# Patient Record
Sex: FEMALE | NOT HISPANIC OR LATINO | Employment: FULL TIME | URBAN - METROPOLITAN AREA
[De-identification: names, ages, dates, MRNs, and addresses within clinical notes are randomized per-mention and may not be internally consistent; named-entity substitution may affect disease eponyms.]

---

## 2023-12-04 NOTE — PROGRESS NOTES
HEARING AID CHECK    Name: Carli Moore  YOB: 1965  Age: 58 y.o.    Date:  12/05/2023    History:  Carli Moore , age 58 years, is here for a hearing aid check. She reports that the right hearing aid is no longer working. She has tried to change the wax traps and batteries at home but this has not solved the problem.       Recall, her most recent hearing test on 8/20/2021 indicated mild sloping to moderately severe sensorineural hearing loss of both ears. She has history of hearing aid use and previously wore Oticon- in the ear hearing aids purchased in 2018 through [facility].     Hearing Aid Information  Right: Phonak Virto  SN: [id number] ~ AOV   Left: Phonak Virto  SN: [id number] ~ AOV   Warranty expiration date: 1/28/2025  Fit date: 2/18/2021     Hearing Aid Check Procedure  Listening check confirms the right device to be non-functional. Battery was changed and wax was removed from the device. This did not fix the problem. Neither hearing aid had wax traps in them. The left device lost it's grommet.    It was recommended that both devices be sent in for repair due to the right device being non-functional and the left device no longer having a grommet to hold the wax trap in place. The patient stated that she did not wish to have her left hearing aid sent in due to the upcoming holiday season. We will send in the just the right hearing aid for now.    The patient was given several packs of cerustops, per her request.     Recommendations  1) Continue use of left hearing aid.  2) Return for hearing aid pickup once it returns from repair. The patient would like to be contacted once it arrives.  3) Re-test hearing annually     Time: 2348-1539    JANET Heller, CCC-A  Licensed Audiologist

## 2023-12-05 ENCOUNTER — CLINICAL SUPPORT (OUTPATIENT)
Dept: AUDIOLOGY | Facility: CLINIC | Age: 58
End: 2023-12-05

## 2023-12-05 DIAGNOSIS — H90.3 SENSORINEURAL HEARING LOSS (SNHL) OF BOTH EARS: Primary | ICD-10-CM

## 2023-12-05 PROCEDURE — V5299 HEARING SERVICE: HCPCS

## 2023-12-05 ASSESSMENT — PAIN SCALES - GENERAL: PAINLEVEL_OUTOF10: 0 - NO PAIN

## 2023-12-15 ENCOUNTER — CLINICAL SUPPORT (OUTPATIENT)
Dept: AUDIOLOGY | Facility: CLINIC | Age: 58
End: 2023-12-15
Payer: COMMERCIAL

## 2023-12-15 DIAGNOSIS — H90.3 SENSORINEURAL HEARING LOSS (SNHL) OF BOTH EARS: Primary | ICD-10-CM

## 2023-12-15 PROCEDURE — V5299 HEARING SERVICE: HCPCS

## 2023-12-15 NOTE — PROGRESS NOTES
HEARING AID CHECK    Name: Carli Moore  YOB: 1965  Age: 58 y.o.    Date:  12/15/2023    History:  Carli Moore , age 58 years, is here for a hearing aid check. She is here to  the right hearing aid from repair. The internal electronics and receivers were replaced, per Phonak. She noted concerns that the left device is not loud enough.    Recall, her most recent hearing test on 8/20/2021 indicated mild sloping to moderately severe sensorineural hearing loss of both ears. She has history of hearing aid use and previously wore Oticon- in the ear hearing aids purchased in 2018 through [facility].     Hearing Aid Information  Right: Phonak Virto  SN: [id number] ~ AOV   Left: Phonak Virto  SN: [id number] ~ AOV   Warranty expiration date: 1/28/2025  Fit date: 2/18/2021     Hearing Aid Check Procedure  The right hearing aid was programmed to match previous settings. It was able to connect to the patient's phone. She reported satisfaction with the sound quality and loudness of this device.    It was determined that the patient was using the volume control on the left device, which was turning it down when she thought she was turning it up. She stated that she would like the left hearing aid sent in for repair, as it is missing it's grommet to hold the wax trap.       Recommendations  1) Return for hearing aid pickup once the left device has returned from repair. The patient will be contacted once it arrives.  2) Schedule hearing aid checks as needed.  3) Re-test hearing annually.    Time: 2251-2376    JANET Heller, CCC-A  Licensed Audiologist

## 2024-01-04 ENCOUNTER — CLINICAL SUPPORT (OUTPATIENT)
Dept: AUDIOLOGY | Facility: CLINIC | Age: 59
End: 2024-01-04
Payer: COMMERCIAL

## 2024-01-04 DIAGNOSIS — H90.3 SENSORINEURAL HEARING LOSS (SNHL) OF BOTH EARS: Primary | ICD-10-CM

## 2024-01-04 ASSESSMENT — PAIN SCALES - GENERAL: PAINLEVEL_OUTOF10: 0 - NO PAIN

## 2024-01-04 NOTE — PROGRESS NOTES
HEARING AID CHECK    Name: Carli Moore  YOB: 1965  Age: 58 y.o.    Date:  01/04/2024    History:  Carli Moore , age 58 years, is here for a hearing aid check. She is here to  the left hearing aid from repair. The internal electronics and receivers were replaced, per Phonak.     Recall, her most recent hearing test on 8/20/2021 indicated mild sloping to moderately severe sensorineural hearing loss of both ears. She has history of hearing aid use and previously wore Oticon- in the ear hearing aids purchased in 2018 through [facility].     Hearing Aid Information  Right: Phonak Virto  SN: [id number] ~ AOV   Left: Phonak Virto  SN: [id number] ~ AOV   Warranty expiration date: 1/28/2025  Fit date: 2/18/2021     Hearing Aid Check Procedure  The left hearing aid was programmed to match previous settings. It was able to connect to the patient's phone. She reported satisfaction with the sound quality and loudness of this device.    Recommendations  1) Continue medical follow-up with established providers  2) Schedule hearing aid checks as needed.  3) Re-test hearing annually.    Time: 6921-7082    JANET Heller, CCC-A  Licensed Audiologist

## 2024-01-24 ENCOUNTER — TELEMEDICINE (OUTPATIENT)
Dept: PRIMARY CARE | Facility: CLINIC | Age: 59
End: 2024-01-24
Payer: COMMERCIAL

## 2024-01-24 VITALS — HEIGHT: 66 IN | BODY MASS INDEX: 28.12 KG/M2 | WEIGHT: 175 LBS

## 2024-01-24 DIAGNOSIS — J40 BRONCHITIS: Primary | ICD-10-CM

## 2024-01-24 PROBLEM — H61.23 BILATERAL IMPACTED CERUMEN: Status: ACTIVE | Noted: 2024-01-24

## 2024-01-24 PROBLEM — H90.3 BILATERAL SENSORINEURAL HEARING LOSS: Status: ACTIVE | Noted: 2024-01-24

## 2024-01-24 PROBLEM — N95.1 VAGINAL DRYNESS, MENOPAUSAL: Status: ACTIVE | Noted: 2024-01-24

## 2024-01-24 PROBLEM — R92.8 ABNORMAL MAMMOGRAM: Status: ACTIVE | Noted: 2022-02-21

## 2024-01-24 PROBLEM — G56.03 BILATERAL CARPAL TUNNEL SYNDROME: Status: ACTIVE | Noted: 2017-12-07

## 2024-01-24 PROBLEM — N64.4 BREAST PAIN: Status: ACTIVE | Noted: 2024-01-24

## 2024-01-24 PROBLEM — M65.312 TRIGGER THUMB OF BOTH THUMBS: Status: ACTIVE | Noted: 2019-01-08

## 2024-01-24 PROBLEM — R29.898 WEAKNESS OF HAND: Status: ACTIVE | Noted: 2024-01-24

## 2024-01-24 PROBLEM — N93.9 ABNORMAL UTERINE BLEEDING: Status: ACTIVE | Noted: 2024-01-24

## 2024-01-24 PROBLEM — H91.90 HOH (HARD OF HEARING): Status: ACTIVE | Noted: 2024-01-24

## 2024-01-24 PROBLEM — N95.1 MENOPAUSAL HOT FLUSHES: Status: ACTIVE | Noted: 2024-01-24

## 2024-01-24 PROBLEM — N95.1 HOT FLASHES DUE TO MENOPAUSE: Status: ACTIVE | Noted: 2024-01-24

## 2024-01-24 PROBLEM — M79.602 PAIN OF LEFT UPPER EXTREMITY: Status: ACTIVE | Noted: 2024-01-24

## 2024-01-24 PROBLEM — H52.4 PRESBYOPIA: Status: ACTIVE | Noted: 2017-11-27

## 2024-01-24 PROBLEM — M65.311 TRIGGER THUMB OF BOTH THUMBS: Status: ACTIVE | Noted: 2019-01-08

## 2024-01-24 PROBLEM — N63.10 BREAST MASS, RIGHT: Status: ACTIVE | Noted: 2024-01-24

## 2024-01-24 PROBLEM — N64.89 BREAST ASYMMETRY: Status: ACTIVE | Noted: 2024-01-24

## 2024-01-24 PROBLEM — R29.898 DECREASED GRIP STRENGTH OF LEFT HAND: Status: ACTIVE | Noted: 2024-01-24

## 2024-01-24 PROBLEM — N89.8 VAGINAL ITCHING: Status: ACTIVE | Noted: 2024-01-24

## 2024-01-24 PROBLEM — R92.1 BREAST CALCIFICATION, LEFT: Status: ACTIVE | Noted: 2024-01-24

## 2024-01-24 PROBLEM — Z86.69 H/O CARPAL TUNNEL SYNDROME: Status: ACTIVE | Noted: 2024-01-24

## 2024-01-24 PROBLEM — M77.12 LEFT TENNIS ELBOW: Status: RESOLVED | Noted: 2018-03-06 | Resolved: 2024-01-24

## 2024-01-24 PROBLEM — Z98.890 S/P LASIK (LASER ASSISTED IN SITU KERATOMILEUSIS): Status: ACTIVE | Noted: 2017-11-27

## 2024-01-24 PROBLEM — N89.8 VAGINAL DISCHARGE: Status: ACTIVE | Noted: 2024-01-24

## 2024-01-24 PROCEDURE — 99203 OFFICE O/P NEW LOW 30 MIN: CPT | Performed by: NURSE PRACTITIONER

## 2024-01-24 RX ORDER — AZITHROMYCIN 250 MG/1
TABLET, FILM COATED ORAL
Qty: 6 TABLET | Refills: 0 | Status: SHIPPED | OUTPATIENT
Start: 2024-01-24 | End: 2024-02-01 | Stop reason: ALTCHOICE

## 2024-01-24 ASSESSMENT — ENCOUNTER SYMPTOMS
COUGH: 1
WHEEZING: 0
CHILLS: 0
FEVER: 0
SWEATS: 0
SHORTNESS OF BREATH: 0
HEADACHES: 1
SORE THROAT: 1

## 2024-01-24 NOTE — PROGRESS NOTES
"Vivienne Moore is a 58 y.o. female who presents for Cough (Patient has had a cough for 3 weeks now, not getting any better. Patient has not had any tests done, took an at home COVID test, it was negative. ).  Cough  This is a new problem. Episode onset: 3 weeks ago. The problem has been gradually worsening. The problem occurs constantly. The cough is Productive of sputum. Associated symptoms include ear pain, headaches and a sore throat. Pertinent negatives include no chest pain, chills, fever, nasal congestion, postnasal drip, shortness of breath, sweats or wheezing. Nothing aggravates the symptoms. She has tried OTC cough suppressant for the symptoms. The treatment provided mild relief. Her past medical history is significant for bronchitis.     Review of Systems   Constitutional:  Negative for chills and fever.   HENT:  Positive for ear pain and sore throat. Negative for postnasal drip.    Respiratory:  Positive for cough. Negative for shortness of breath and wheezing.    Cardiovascular:  Negative for chest pain.   Neurological:  Positive for headaches.     Objective   Physical Exam  Constitutional:       General: She is not in acute distress.     Appearance: She is ill-appearing. She is not toxic-appearing.   Eyes:      Conjunctiva/sclera: Conjunctivae normal.   Pulmonary:      Effort: Pulmonary effort is normal.   Neurological:      Mental Status: She is alert and oriented to person, place, and time.     Ht 1.676 m (5' 6\")   Wt 79.4 kg (175 lb)   BMI 28.25 kg/m²   Assessment/Plan   1. Bronchitis  azithromycin (Zithromax) 250 mg tablet      Increase oral fluids/water, at least eight 8-oz glasses/day.  Get plenty of rest.  Cepacol lozenges and/or Chloraseptic spray PRN for sore throat. Salt water gargle or broth for comfort.  Alternate Tylenol/Ibuprofen PRN for body aches and/or fever  Saline nasal spray PRN for rhinitis.  Guaifenessin/Guaifenissin DM PRN for cough  Flonase nasal " spray.    Follow-up as needed.

## 2024-02-01 ENCOUNTER — OFFICE VISIT (OUTPATIENT)
Dept: PRIMARY CARE | Facility: CLINIC | Age: 59
End: 2024-02-01
Payer: COMMERCIAL

## 2024-02-01 VITALS
HEART RATE: 90 BPM | DIASTOLIC BLOOD PRESSURE: 66 MMHG | TEMPERATURE: 97.5 F | WEIGHT: 185 LBS | HEIGHT: 66 IN | RESPIRATION RATE: 18 BRPM | OXYGEN SATURATION: 96 % | SYSTOLIC BLOOD PRESSURE: 107 MMHG | BODY MASS INDEX: 29.73 KG/M2

## 2024-02-01 DIAGNOSIS — J40 BRONCHITIS: Primary | ICD-10-CM

## 2024-02-01 PROCEDURE — 1036F TOBACCO NON-USER: CPT | Performed by: NURSE PRACTITIONER

## 2024-02-01 PROCEDURE — 99213 OFFICE O/P EST LOW 20 MIN: CPT | Performed by: NURSE PRACTITIONER

## 2024-02-01 RX ORDER — ALBUTEROL SULFATE 90 UG/1
2 AEROSOL, METERED RESPIRATORY (INHALATION) EVERY 4 HOURS PRN
Qty: 8.5 G | Refills: 0 | Status: SHIPPED | OUTPATIENT
Start: 2024-02-01 | End: 2025-01-31

## 2024-02-01 RX ORDER — CETIRIZINE HYDROCHLORIDE 10 MG/1
10 TABLET ORAL DAILY
Qty: 30 TABLET | Refills: 5 | Status: SHIPPED | OUTPATIENT
Start: 2024-02-01 | End: 2024-07-30

## 2024-02-01 RX ORDER — METHYLPREDNISOLONE 4 MG/1
TABLET ORAL
Qty: 21 TABLET | Refills: 0 | Status: SHIPPED | OUTPATIENT
Start: 2024-02-01 | End: 2024-02-08

## 2024-02-01 RX ORDER — DOXYCYCLINE 100 MG/1
100 CAPSULE ORAL 2 TIMES DAILY
Qty: 14 CAPSULE | Refills: 0 | Status: SHIPPED | OUTPATIENT
Start: 2024-02-01 | End: 2024-02-08

## 2024-02-01 ASSESSMENT — ENCOUNTER SYMPTOMS
HEADACHES: 1
COUGH: 1
SORE THROAT: 1

## 2024-02-01 NOTE — PROGRESS NOTES
"Vivienne Moore is a 58 y.o. female who presents for Cough.  Cough  The current episode started more than 1 month ago. The problem has been unchanged. The cough is Productive of sputum. Associated symptoms include headaches and a sore throat.   Review of Systems   HENT:  Positive for sore throat.    Respiratory:  Positive for cough.    Neurological:  Positive for headaches.   Objective   Physical Exam  Constitutional:       Appearance: Normal appearance.   Pulmonary:      Effort: Pulmonary effort is normal.      Comments: Very dry, rhaspy cough  Neurological:      Mental Status: She is alert.     /66   Pulse 90   Temp 36.4 °C (97.5 °F)   Resp 18   Ht 1.676 m (5' 6\")   Wt 83.9 kg (185 lb)   SpO2 96%   BMI 29.86 kg/m²   Assessment/Plan   Problem List Items Addressed This Visit    None  Visit Diagnoses       Bronchitis    -  Primary    Relevant Medications    methylPREDNISolone (Medrol Dospak) 4 mg tablets    doxycycline (Vibramycin) 100 mg capsule    albuterol (ProAir HFA) 90 mcg/actuation inhaler    cetirizine (ZyrTEC) 10 mg tablet    Other Relevant Orders    Aerochamber Spacer Device          Follow up for an annual exam        "

## 2024-02-05 ENCOUNTER — APPOINTMENT (OUTPATIENT)
Dept: PRIMARY CARE | Facility: CLINIC | Age: 59
End: 2024-02-05
Payer: COMMERCIAL

## 2024-02-12 ENCOUNTER — APPOINTMENT (OUTPATIENT)
Dept: PRIMARY CARE | Facility: CLINIC | Age: 59
End: 2024-02-12
Payer: COMMERCIAL

## 2024-04-29 ENCOUNTER — OFFICE VISIT (OUTPATIENT)
Dept: ORTHOPEDIC SURGERY | Facility: CLINIC | Age: 59
End: 2024-04-29
Payer: COMMERCIAL

## 2024-04-29 DIAGNOSIS — G56.02 CARPAL TUNNEL SYNDROME OF LEFT WRIST: ICD-10-CM

## 2024-04-29 DIAGNOSIS — M65.30 ACQUIRED TRIGGER FINGER: Primary | ICD-10-CM

## 2024-04-29 PROCEDURE — 20526 THER INJECTION CARP TUNNEL: CPT | Performed by: ORTHOPAEDIC SURGERY

## 2024-04-29 PROCEDURE — 1036F TOBACCO NON-USER: CPT | Performed by: ORTHOPAEDIC SURGERY

## 2024-04-29 PROCEDURE — 2500000004 HC RX 250 GENERAL PHARMACY W/ HCPCS (ALT 636 FOR OP/ED): Performed by: ORTHOPAEDIC SURGERY

## 2024-04-29 PROCEDURE — 20550 NJX 1 TENDON SHEATH/LIGAMENT: CPT | Performed by: ORTHOPAEDIC SURGERY

## 2024-04-29 PROCEDURE — 99204 OFFICE O/P NEW MOD 45 MIN: CPT | Performed by: ORTHOPAEDIC SURGERY

## 2024-04-29 PROCEDURE — 99214 OFFICE O/P EST MOD 30 MIN: CPT | Performed by: ORTHOPAEDIC SURGERY

## 2024-04-29 PROCEDURE — 2500000005 HC RX 250 GENERAL PHARMACY W/O HCPCS: Performed by: ORTHOPAEDIC SURGERY

## 2024-04-29 RX ORDER — LIDOCAINE HYDROCHLORIDE 10 MG/ML
1 INJECTION INFILTRATION; PERINEURAL
Status: COMPLETED | OUTPATIENT
Start: 2024-04-29 | End: 2024-04-29

## 2024-04-29 RX ADMIN — TRIAMCINOLONE ACETONIDE 10 MG: 10 INJECTION, SUSPENSION INTRA-ARTICULAR; INTRALESIONAL at 09:01

## 2024-04-29 RX ADMIN — LIDOCAINE HYDROCHLORIDE 1 ML: 10 INJECTION, SOLUTION INFILTRATION; PERINEURAL at 09:01

## 2024-04-29 NOTE — PROGRESS NOTES
4/29/2024    Chief Complaint   Patient presents with    Left Hand - Pain     LT hand numbness tingling  Hx bi lat CTR       History of Present Illness:  Patient Carli Moore , 58 y.o. female, presents today, 4/29/2024, for evaluation of left hand pain and numbness.  The symptoms began to bother her about 3 to 4 months ago.  She describes primarily numbness and tingling to thumb index and long finger.  She is status post carpal tunnel release done in 2017 or 2018 with Dr. Arzola.  She had complete resolution of symptoms for some time up until couple months ago.  She also describes pain and tenderness to the base of the left index finger.  She describes increased force of work with flexion of the digit.  She is right-hand dominant dividual and otherwise healthy.       Review of Systems:   GENERAL: Negative  GI: Negative  MUSCULOSKELETAL: See HPI  SKIN: Negative  NEURO:  Negative     Physical Exam:  GENERAL:  Alert and oriented to person, place, and time.  No acute distress and breathing comfortably; pleasant and cooperative with the examination.  HEENT:  Head is normocephalic and atraumatic.  NECK:  Supple, no visible swelling.  CARDIOVASCULAR:  No palpable tachycardia.  LUNGS:  No audible wheezing or labored breathing.  ABDOMEN:  Nondistended.  Extremities: Evaluation of left upper extremity finds the patient to have a palpable radial artery at the wrist with brisk capillary refill to all digits. The patient has intact sensorium to axillary, radial, median and ulnar nerves. There are no open wounds. There are no signs of infection. There is no evidence of lymphedema or lymphatic streaking. The patient has supple compartments of the left arm, forearm and hand.  She is tender palpation over the A1 pulley of the left index finger with mild crepitus noted with flexion and extension.  She has positive Tinel's over the median nerve at the left wrist.  Positive Phalen's and Durkan's compression maneuver.  She has no  dysesthesia with palpation over the surgical incision site however.  None today.     Imaging/Test Results:  None today.     Assessment:  #1: Recurrent left carpal tunnel syndrome.  #2: Left index finger trigger digit.     Plan:  Operative and nonoperative treatment strategies were discussed.  Recommendations were made for initial nonoperative management through diagnostic/therapeutic injection of the left carpal tunnel with left index finger trigger digit injection to A1 pulley.  Injection was performed by Dr. Tinajero and tolerated well by patient.  We will give them a new nighttime brace.  We will order and help facilitate EMG nerve conduction test.  Follow-up in approximately 4 weeks for repeat clinical exam, after EMG nerve conduction test has been performed.  No radiographs necessary upon return.  Patient verbalized agreement and understanding of plan for care.  All questions answered at today's visit.    Hand / UE Inj/Asp: L carpal tunnel for carpal tunnel syndrome on 4/29/2024 9:01 AM  Indications: diagnostic and therapeutic  Details: 25 G needle, volar approach  Medications: 10 mg triamcinolone acetonide 10 mg/mL; 1 mL lidocaine 10 mg/mL (1 %)  Outcome: tolerated well, no immediate complications    Left Carpal Tunnel Injection: It was explained to the patient that the risks of a steroid injection include but are not limited to infection, local skin irritation, skin atrophy, calcification, continued pain and discomfort, elevated blood sugar, burning, failure to relieve pain, and possible late infection. The patient verbalized good insight and verbalized consent for the injection. It was further explained that the post-injection discomfort can be alleviated with additional medications, ice, elevation, and rest over the first 24 hours, and that these modalities are recommended.  After informed consent was provided, patient identification was confirmed, and allergies were verified, the patient was appropriately  positioned. The site was marked and time-out performed.    Using aseptic technique, a solution containing 1 mL of 10 mg of Kenalog and 1 mL of 1% lidocaine without epinephrine was injected into the patient's left carpal tunnel. A 25-gauge needle was inserted just ulnar to the anatomic course of the palmaris longus tendon at the level of the distal wrist flexion crease. It was slowly advanced deep to the distal antebrachial fascia. The solution was then injected slowly, taking care to ensure that the patient experienced no paresthesias during the injection of the solution. After injection of the solution, the patient was asked to perform active flexion and extension of the digits and wrist to help disperse the solution. A band-aid was then placed at the injection site. The patient tolerated this left carpal tunnel injection well.      Procedure, treatment alternatives, risks and benefits explained, specific risks discussed. Consent was given by the patient. Immediately prior to procedure a time out was called to verify the correct patient, procedure, equipment, support staff and site/side marked as required. Patient was prepped and draped in the usual sterile fashion.       Hand / UE Inj/Asp: L index A1 for trigger finger on 4/29/2024 9:01 AM  Indications: pain and tendon swelling  Details: 25 G needle, volar approach  Medications: 10 mg triamcinolone acetonide 10 mg/mL; 1 mL lidocaine 10 mg/mL (1 %)  Outcome: tolerated well, no immediate complications  Procedure, treatment alternatives, risks and benefits explained, specific risks discussed. Consent was given by the patient. Immediately prior to procedure a time out was called to verify the correct patient, procedure, equipment, support staff and site/side marked as required. Patient was prepped and draped in the usual sterile fashion.         In a face to face encounter, I performed a history and physical examination, discussed pertinent diagnostic studies if  indicated, and discussed diagnosis and management strategies with both the patient and the mid-level provider. I reviewed the mid-level's note and agree with the documented findings and plan of care.  Patient presents today for evaluation of the left upper extremity.  She describes symptoms consistent with recurrent left carpal tunnel syndrome and left index finger trigger finger.  Patient is status post bilateral carpal tunnel release.  Exam shows positive Tinel's over course of median nerve to left wrist.  Focal tenderness and triggering to the left index finger.  Treatment options were discussed.  Patient elects for steroid injection both to carpal tunnel into the index finger trigger and for EMG nerve conduction study test.  She was provided a new brace for the treatment of recurrent left carpal tunnel syndrome to be used at nighttime.  Follow-up after EMG.  No x-rays upon return.  Of note she is having some discrete lateral shoulder pain.  We explained that it is not typically observed in the setting of carpal tunnel syndrome.  That may be related to discrete shoulder pathology as compared to peripheral nerve compression.

## 2024-07-11 ENCOUNTER — APPOINTMENT (OUTPATIENT)
Dept: NEUROLOGY | Facility: CLINIC | Age: 59
End: 2024-07-11
Payer: COMMERCIAL

## 2024-12-12 ENCOUNTER — CLINICAL SUPPORT (OUTPATIENT)
Dept: AUDIOLOGY | Facility: CLINIC | Age: 59
End: 2024-12-12

## 2024-12-12 DIAGNOSIS — H90.3 SENSORINEURAL HEARING LOSS (SNHL) OF BOTH EARS: Primary | ICD-10-CM

## 2024-12-12 PROCEDURE — V5299 HEARING SERVICE: HCPCS | Mod: AUDSP | Performed by: AUDIOLOGIST

## 2024-12-12 NOTE — PROGRESS NOTES
HEARING AID CHECK     Name: Carli Moore  YOB: 1965  Age: 58 y.o.     Date:  12/12/2024     History:  Carli Moore , age 59 years, is here for a hearing aid check. She reports bilateral hairline fractures on casing of both hearing aids.     Recall, her most recent hearing test on 8/20/2021 indicated mild sloping to moderately severe sensorineural hearing loss of both ears. She has history of hearing aid use and previously wore Oticon- in the ear hearing aids purchased in 2018 through [facility].      Hearing Aid Information  Right: Phonak Virto  SN: [id number] ~ AOV   Left: Phonak Virto  SN: [id number] ~ AOV   Warranty expiration date: 1/28/2025  Fit date: 2/18/2021     Hearing Aid Check:  Both hearing aids were examined. Small scratches noted on both hearing aids not affecting sound quality or casing problems. Both hearing aids are in warranty. Hearing aids will be sent out in January 2025 for an in warranty check.     Scheduled for 1/2/25 at 8:30 am.    Recommendations  1) Return for hearing aid drop off for in warranty repair  2) Schedule hearing aid checks as needed.  3) Re-test hearing annually.    Time: 850-980    Completed by:  Richard Tai, CCC-A  Licensed Senior Audiologist

## 2025-01-02 ENCOUNTER — CLINICAL SUPPORT (OUTPATIENT)
Dept: AUDIOLOGY | Facility: CLINIC | Age: 60
End: 2025-01-02

## 2025-01-02 DIAGNOSIS — H90.3 SENSORINEURAL HEARING LOSS (SNHL) OF BOTH EARS: Primary | ICD-10-CM

## 2025-01-02 PROCEDURE — V5299 HEARING SERVICE: HCPCS | Performed by: AUDIOLOGIST

## 2025-01-02 NOTE — PROGRESS NOTES
HEARING AID CHECK     Name: Carli Moore  YOB: 1965  Age: 58 y.o.     Date:  12/12/2024     History:  Carli Moore , age 59 years, is here for a hearing aid check to send her hearing aids in for an in warranty repair. She reports bilateral hairline fractures on casing of both hearing aids.     Recall, her most recent hearing test on 8/20/2021 indicated mild sloping to moderately severe sensorineural hearing loss of both ears. She has history of hearing aid use and previously wore Oticon- in the ear hearing aids purchased in 2018 through [facility].      Hearing Aid Information  Right: Phonak Virto  SN: [id number] ~ AOV   Left: Phonak Virto  SN: [id number] ~ AOV   Warranty expiration date: 1/28/2025  Fit date: 2/18/2021     Hearing Aid Check:  Hearing aids sent to  for repair. Carli was fit with loaner Phonak Audeo P90 hearing aids to use while her hearing aids are being repaired.    Carli is eligible for new hearing aids in February 2025. Ear mold impressions were taken without incidence. Prior authorization will be started today.    Recommendations  1) Return for hearing aid  in warranty repair  2) Schedule hearing aid checks as needed.  3) Re-test hearing annually.    Time: 830-900    Completed by:  Richard Tai, CCC-A  Licensed Senior Audiologist      Patient attended Phase 2 Cardiac Rehab Exercise Session. Further documentation will be completed in Cardiac Science/[employer] System and will be scanned into the medical record upon discharge.     Yes

## 2025-01-21 ENCOUNTER — CLINICAL SUPPORT (OUTPATIENT)
Dept: AUDIOLOGY | Facility: CLINIC | Age: 60
End: 2025-01-21
Payer: COMMERCIAL

## 2025-01-21 DIAGNOSIS — H90.3 SENSORINEURAL HEARING LOSS (SNHL) OF BOTH EARS: Primary | ICD-10-CM

## 2025-01-21 PROCEDURE — 92567 TYMPANOMETRY: CPT | Performed by: AUDIOLOGIST

## 2025-01-21 PROCEDURE — 92557 COMPREHENSIVE HEARING TEST: CPT | Performed by: AUDIOLOGIST

## 2025-01-21 NOTE — PROGRESS NOTES
HEARING AID CHECK     Name: Carli Moore  YOB: 1965  Age: 58 y.o.     Date:  12/12/2024     History:  Carli Moore , age 59 years, is here for a hearing aid check to  her repaired hearing aids and to update her hearing test for prior authorization for new hearing aids     Recall, her most recent hearing test on 8/20/2021 indicated mild sloping to moderately severe sensorineural hearing loss of both ears. She has history of hearing aid use and previously wore Oticon- in the ear hearing aids purchased in 2018 through [facility].      Hearing Aid Information  Right: Phonak Virto  SN: [id number] ~ AOV   Left: Phonak Virto  SN: [id number] ~ AOV   Warranty expiration date: 1/28/2025  Fit date: 2/18/2021     Evaluation:  Otoscopy revealed wax in both canals with visible tympanic membranes bilaterally.    Immittance:  Right: Type A middle ear function with normal compliance, peak pressure, and ear canal volume.  Left: Type A middle ear function with normal compliance, peak pressure, and ear canal volume.    Ipsilateral Acoustic Reflexes:  Inability to maintain seal    Behavioral Audiometry:  Right: mild sloping to moderately-severe sensorineural hearing loss 125-8000 Hz. Good word understanding (88 %) at 80 dB HL.  Left:  mild sloping to moderately-severe sensorineural hearing loss 125-8000 Hz. Good word understanding (80 %) at 70 dB HL.    Pure tone averages in agreement with speech reception thresholds.    Results:  Today's results were discussed with the patient indicating stable results. Normal tympanograms and excellent word understanding bilaterally.    Recommendations  1) Return for hearing aid checks as needed  2) Follow up for hearing aid fitting once hearing aid arrive  3) Re-test hearing annually.    Time: 900-1000    Completed by:  Richard Tai, CCC-A  Licensed Senior Audiologist

## 2025-01-30 ENCOUNTER — APPOINTMENT (OUTPATIENT)
Dept: OPHTHALMOLOGY | Facility: CLINIC | Age: 60
End: 2025-01-30
Payer: COMMERCIAL

## 2025-01-31 ENCOUNTER — HOSPITAL ENCOUNTER (OUTPATIENT)
Dept: RADIOLOGY | Facility: CLINIC | Age: 60
Discharge: HOME | End: 2025-01-31
Payer: COMMERCIAL

## 2025-01-31 ENCOUNTER — OFFICE VISIT (OUTPATIENT)
Dept: PRIMARY CARE | Facility: CLINIC | Age: 60
End: 2025-01-31
Payer: COMMERCIAL

## 2025-01-31 VITALS
RESPIRATION RATE: 17 BRPM | HEIGHT: 66 IN | WEIGHT: 181 LBS | HEART RATE: 77 BPM | OXYGEN SATURATION: 98 % | BODY MASS INDEX: 29.09 KG/M2 | TEMPERATURE: 97.9 F

## 2025-01-31 DIAGNOSIS — Z12.39 ENCOUNTER FOR OTHER SCREENING FOR MALIGNANT NEOPLASM OF BREAST: ICD-10-CM

## 2025-01-31 DIAGNOSIS — E55.9 VITAMIN D DEFICIENCY: Primary | ICD-10-CM

## 2025-01-31 DIAGNOSIS — Z00.00 WELLNESS EXAMINATION: ICD-10-CM

## 2025-01-31 DIAGNOSIS — L29.9 CHRONIC PRURITUS: ICD-10-CM

## 2025-01-31 DIAGNOSIS — G56.03 BILATERAL CARPAL TUNNEL SYNDROME: ICD-10-CM

## 2025-01-31 DIAGNOSIS — Z13.6 ENCOUNTER FOR SCREENING FOR CARDIAC ALLOGRAFT VASCULOPATHY: ICD-10-CM

## 2025-01-31 DIAGNOSIS — R12 HEARTBURN: ICD-10-CM

## 2025-01-31 DIAGNOSIS — R03.0 ELEVATED BP WITHOUT DIAGNOSIS OF HYPERTENSION: ICD-10-CM

## 2025-01-31 DIAGNOSIS — R41.89 BRAIN FOG: ICD-10-CM

## 2025-01-31 DIAGNOSIS — Z78.9 ADEQUATE NUTRITION: ICD-10-CM

## 2025-01-31 PROCEDURE — 77067 SCR MAMMO BI INCL CAD: CPT

## 2025-01-31 PROCEDURE — 3008F BODY MASS INDEX DOCD: CPT | Performed by: NURSE PRACTITIONER

## 2025-01-31 PROCEDURE — 1036F TOBACCO NON-USER: CPT | Performed by: NURSE PRACTITIONER

## 2025-01-31 PROCEDURE — 99396 PREV VISIT EST AGE 40-64: CPT | Performed by: NURSE PRACTITIONER

## 2025-01-31 RX ORDER — ACETAMINOPHEN 500 MG
2000 TABLET ORAL DAILY
Qty: 90 CAPSULE | Refills: 3 | Status: SHIPPED | OUTPATIENT
Start: 2025-01-31 | End: 2026-01-31

## 2025-01-31 RX ORDER — IBUPROFEN 100 MG/5ML
1000 SUSPENSION, ORAL (FINAL DOSE FORM) ORAL DAILY
Qty: 90 TABLET | Refills: 3 | Status: SHIPPED | OUTPATIENT
Start: 2025-01-31 | End: 2025-01-31

## 2025-01-31 RX ORDER — DIPHENHYDRAMINE HCL 25 MG
25 CAPSULE ORAL EVERY 6 HOURS PRN
Qty: 30 CAPSULE | Refills: 0 | Status: SHIPPED | OUTPATIENT
Start: 2025-01-31 | End: 2025-02-10

## 2025-01-31 RX ORDER — FAMOTIDINE 20 MG/1
20 TABLET, FILM COATED ORAL 2 TIMES DAILY
Qty: 60 TABLET | Refills: 5 | Status: SHIPPED | OUTPATIENT
Start: 2025-01-31 | End: 2025-07-30

## 2025-01-31 RX ORDER — CHOLECALCIFEROL (VITAMIN D3) 25 MCG
1 TABLET,CHEWABLE ORAL DAILY
Start: 2025-01-31 | End: 2025-01-31 | Stop reason: DRUGHIGH

## 2025-01-31 RX ORDER — ASCORBIC ACID 500 MG
500 TABLET ORAL DAILY
Qty: 90 TABLET | Refills: 3 | Status: SHIPPED | OUTPATIENT
Start: 2025-01-31 | End: 2026-01-31

## 2025-01-31 RX ORDER — PSYLLIUM HUSK 0.4 G
2 CAPSULE ORAL DAILY
Start: 2025-01-31 | End: 2026-01-31

## 2025-01-31 RX ORDER — LANOLIN ALCOHOL/MO/W.PET/CERES
1000 CREAM (GRAM) TOPICAL DAILY
Qty: 30 TABLET | Refills: 11 | Status: SHIPPED | OUTPATIENT
Start: 2025-01-31 | End: 2026-01-31

## 2025-01-31 ASSESSMENT — ENCOUNTER SYMPTOMS
DIARRHEA: 0
PALPITATIONS: 0
WEAKNESS: 0
SLEEP DISTURBANCE: 0
COUGH: 0
SHORTNESS OF BREATH: 0
HEADACHES: 0
CONSTIPATION: 0
FATIGUE: 0
ABDOMINAL PAIN: 0
FEVER: 0
DECREASED CONCENTRATION: 0
NERVOUS/ANXIOUS: 0

## 2025-01-31 ASSESSMENT — PATIENT HEALTH QUESTIONNAIRE - PHQ9
2. FEELING DOWN, DEPRESSED OR HOPELESS: NOT AT ALL
SUM OF ALL RESPONSES TO PHQ9 QUESTIONS 1 AND 2: 0
1. LITTLE INTEREST OR PLEASURE IN DOING THINGS: NOT AT ALL

## 2025-01-31 NOTE — ASSESSMENT & PLAN NOTE
Patient is trying to get scheduled for her EMG that was ordered by Dr. Tinajero in 4/2024. She is waiting for a call back from Central Scheduling. Message sent to Lexy Ramos in central scheduling for assistance with this referral.

## 2025-01-31 NOTE — PROGRESS NOTES
"Vivienne Moore is a 59 y.o. female who presents for Annual Exam.    Patient reported health as : Good  Regular dental visists : Yes   Regular eye exams : Yes   Hearing loss : Yes, wear's hearing aids.   Well balanced diet : Yes   Weight Concerns : Yes   Exercise : Regular    HPI  Review of Systems   Constitutional:  Negative for fatigue and fever.   HENT: Negative.     Respiratory:  Negative for cough and shortness of breath.    Cardiovascular:  Negative for chest pain and palpitations.   Gastrointestinal:  Negative for abdominal pain, constipation and diarrhea.   Neurological:  Negative for weakness and headaches.   Psychiatric/Behavioral:  Negative for decreased concentration and sleep disturbance. The patient is not nervous/anxious.        Objective     Physical Exam  Vitals reviewed.   Constitutional:       Appearance: Normal appearance.   HENT:      Head: Normocephalic.   Eyes:      Conjunctiva/sclera: Conjunctivae normal.   Cardiovascular:      Rate and Rhythm: Normal rate and regular rhythm.      Pulses: Normal pulses.      Heart sounds: No murmur heard.  Pulmonary:      Breath sounds: Normal breath sounds.   Abdominal:      General: Bowel sounds are normal.      Palpations: Abdomen is soft.   Musculoskeletal:      Cervical back: Neck supple.      Right lower leg: No edema.      Left lower leg: No edema.   Skin:     General: Skin is warm and dry.   Neurological:      General: No focal deficit present.      Mental Status: She is alert and oriented to person, place, and time.   Psychiatric:         Mood and Affect: Mood normal.         Thought Content: Thought content normal.     Pulse 77   Temp 36.6 °C (97.9 °F)   Resp 17   Ht 1.676 m (5' 6\")   Wt 82.1 kg (181 lb)   SpO2 98%   BMI 29.21 kg/m²     Assessment/Plan     Problem List Items Addressed This Visit       Bilateral carpal tunnel syndrome    Overview     H/o prior camille carpal tunnel surgery at [place] 2018 mayo.   Has " seen Dr. CHASTITY Tinajero 4/2024 and had bilateral injections. He states next step is nerve conduction test to determine whether repeat surgery is needed.              Current Assessment & Plan     Patient is trying to get scheduled for her EMG that was ordered by Dr. Tinajero in 4/2024. She is waiting for a call back from Central Scheduling. Message sent to Lexy Ramos in central scheduling for assistance with this referral.           Other Visit Diagnoses       Vitamin D deficiency    -  Primary    Relevant Medications    cholecalciferol (Vitamin D3) 50 mcg (2,000 unit) capsule    Chronic pruritus        Relevant Medications    diphenhydrAMINE (BENADryl) 25 mg capsule    Heartburn        Relevant Medications    famotidine (Pepcid) 20 mg tablet    Bifidobacterium infantis (ALIGN) 4 mg capsule    psyllium (Metamucil) 0.4 gram capsule    Brain fog        Relevant Medications    cyanocobalamin (Vitamin B-12) 1,000 mcg tablet    Other Relevant Orders    Vitamin B12    Adequate nutrition        Relevant Medications    multivitamin-Ca-iron-minerals (Tab-A-Sera Womens) 27-0.4 mg tablet    ascorbic acid (Vitamin C) 500 mg tablet    Wellness examination        Relevant Orders    CBC    Lipid Panel    Comprehensive Metabolic Panel    TSH with reflex to Free T4 if abnormal    Vitamin D 25-Hydroxy,Total (for eval of Vitamin D levels)    Follow Up In [facility] - PCP - Health Maintenance    Encounter for other screening for malignant neoplasm of breast        Relevant Orders    BI mammo bilateral screening tomosynthesis    Encounter for screening for cardiac allograft vasculopathy        Relevant Orders    CT cardiac scoring wo IV contrast

## 2025-02-01 LAB
25(OH)D3+25(OH)D2 SERPL-MCNC: 46 NG/ML (ref 30–100)
ALBUMIN SERPL-MCNC: 4.5 G/DL (ref 3.6–5.1)
ALP SERPL-CCNC: 62 U/L (ref 37–153)
ALT SERPL-CCNC: 14 U/L (ref 6–29)
ANION GAP SERPL CALCULATED.4IONS-SCNC: 10 MMOL/L (CALC) (ref 7–17)
AST SERPL-CCNC: 14 U/L (ref 10–35)
BILIRUB SERPL-MCNC: 0.9 MG/DL (ref 0.2–1.2)
BUN SERPL-MCNC: 18 MG/DL (ref 7–25)
CALCIUM SERPL-MCNC: 9.6 MG/DL (ref 8.6–10.4)
CHLORIDE SERPL-SCNC: 106 MMOL/L (ref 98–110)
CHOLEST SERPL-MCNC: 216 MG/DL
CHOLEST/HDLC SERPL: 2.8 (CALC)
CO2 SERPL-SCNC: 26 MMOL/L (ref 20–32)
CREAT SERPL-MCNC: 0.9 MG/DL (ref 0.5–1.03)
EGFRCR SERPLBLD CKD-EPI 2021: 74 ML/MIN/1.73M2
ERYTHROCYTE [DISTWIDTH] IN BLOOD BY AUTOMATED COUNT: 12.1 % (ref 11–15)
GLUCOSE SERPL-MCNC: 89 MG/DL (ref 65–99)
HCT VFR BLD AUTO: 41.4 % (ref 35–45)
HDLC SERPL-MCNC: 77 MG/DL
HGB BLD-MCNC: 13.7 G/DL (ref 11.7–15.5)
LDLC SERPL CALC-MCNC: 119 MG/DL (CALC)
MCH RBC QN AUTO: 30.2 PG (ref 27–33)
MCHC RBC AUTO-ENTMCNC: 33.1 G/DL (ref 32–36)
MCV RBC AUTO: 91.2 FL (ref 80–100)
NONHDLC SERPL-MCNC: 139 MG/DL (CALC)
PLATELET # BLD AUTO: 359 THOUSAND/UL (ref 140–400)
PMV BLD REES-ECKER: 10 FL (ref 7.5–12.5)
POTASSIUM SERPL-SCNC: 4.2 MMOL/L (ref 3.5–5.3)
PROT SERPL-MCNC: 6.6 G/DL (ref 6.1–8.1)
RBC # BLD AUTO: 4.54 MILLION/UL (ref 3.8–5.1)
SODIUM SERPL-SCNC: 142 MMOL/L (ref 135–146)
TRIGL SERPL-MCNC: 102 MG/DL
TSH SERPL-ACNC: 1.97 MIU/L (ref 0.4–4.5)
VIT B12 SERPL-MCNC: 1497 PG/ML (ref 200–1100)
WBC # BLD AUTO: 6.8 THOUSAND/UL (ref 3.8–10.8)

## 2025-02-06 ENCOUNTER — APPOINTMENT (OUTPATIENT)
Dept: OPHTHALMOLOGY | Facility: CLINIC | Age: 60
End: 2025-02-06
Payer: COMMERCIAL

## 2025-02-06 DIAGNOSIS — H02.824 CYST OF LEFT UPPER EYELID: ICD-10-CM

## 2025-02-06 DIAGNOSIS — H00.14 CHALAZION OF LEFT UPPER EYELID: Primary | ICD-10-CM

## 2025-02-06 PROCEDURE — 99203 OFFICE O/P NEW LOW 30 MIN: CPT

## 2025-02-06 RX ORDER — NEOMYCIN SULFATE, POLYMYXIN B SULFATE, AND DEXAMETHASONE 3.5; 10000; 1 MG/G; [USP'U]/G; MG/G
OINTMENT OPHTHALMIC 2 TIMES DAILY
Qty: 5 G | Refills: 1 | Status: SHIPPED | OUTPATIENT
Start: 2025-02-06 | End: 2025-02-20

## 2025-02-06 RX ORDER — DOXYCYCLINE HYCLATE 100 MG
100 TABLET ORAL 2 TIMES DAILY
Qty: 20 TABLET | Refills: 0 | Status: SHIPPED | OUTPATIENT
Start: 2025-02-06 | End: 2025-02-16

## 2025-02-06 ASSESSMENT — CONF VISUAL FIELD
OD_INFERIOR_TEMPORAL_RESTRICTION: 0
OS_INFERIOR_TEMPORAL_RESTRICTION: 0
OS_SUPERIOR_TEMPORAL_RESTRICTION: 0
OS_INFERIOR_NASAL_RESTRICTION: 0
OS_NORMAL: 1
OS_SUPERIOR_NASAL_RESTRICTION: 0
OD_NORMAL: 1
OD_INFERIOR_NASAL_RESTRICTION: 0
OD_SUPERIOR_NASAL_RESTRICTION: 0
OD_SUPERIOR_TEMPORAL_RESTRICTION: 0

## 2025-02-06 ASSESSMENT — VISUAL ACUITY
OS_SC: 20/25
METHOD: SNELLEN - LINEAR
OD_SC: 20/20

## 2025-02-06 ASSESSMENT — ENCOUNTER SYMPTOMS
MUSCULOSKELETAL NEGATIVE: 0
ENDOCRINE NEGATIVE: 0
GASTROINTESTINAL NEGATIVE: 0
NEUROLOGICAL NEGATIVE: 0
PSYCHIATRIC NEGATIVE: 0
HEMATOLOGIC/LYMPHATIC NEGATIVE: 0
ALLERGIC/IMMUNOLOGIC NEGATIVE: 0
CARDIOVASCULAR NEGATIVE: 0
RESPIRATORY NEGATIVE: 0
EYES NEGATIVE: 1
CONSTITUTIONAL NEGATIVE: 0

## 2025-02-06 ASSESSMENT — TONOMETRY
OD_IOP_MMHG: 16
OS_IOP_MMHG: 16
IOP_METHOD: GOLDMANN APPLANATION

## 2025-02-06 ASSESSMENT — EXTERNAL EXAM - LEFT EYE: OS_EXAM: NORMAL

## 2025-02-06 ASSESSMENT — EXTERNAL EXAM - RIGHT EYE: OD_EXAM: NORMAL

## 2025-02-06 ASSESSMENT — SLIT LAMP EXAM - LIDS: COMMENTS: NORMAL

## 2025-02-06 NOTE — PROGRESS NOTES
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  Discussed r/b/a of observation vs surgical excision, again citing the risk of madarosis given the location. Pt does not wish to have excision at this time and prefers to observe.   No concerning features with either lesion (i.e. no madarosis/ulceration/telangiectasia)  For ROMERO chalazion, which is nearly resolved, discussed options of conservative treatment with maxitrol/doxy vs corticosteroid injection. Given small residual size and proximity to lacrimal system, would not recommend surgical excision at this juncture.  Pt wishes to proceed with corticosteroid injection, r/b/a discussed and consent signed. A mixture of lidocaine 2% with epinephrine and dexamethasone (4mg/mL) performed 2/6/25 without complication was injected (0.2 mL) in the location of the ROMERO chalazion. This was tolerated well and completed successfully without complication.  Rx sent for maxitrol BID left eye/eyelid for 14 days  Rx sent for doxycycline 100mg BID for 10 days (informed she should stop prior to planned trip to [place] due to risk of photosensitive rash)  F/U in 1-3 weeks for reassessment and further management if necessary. Discussed return precautions and instructed pt to call, present to ED, or seek medical attention with any acute changes or worsening.    Emily Baez,  MD

## 2025-02-09 DIAGNOSIS — R92.8 ABNORMAL MAMMOGRAM: Primary | ICD-10-CM

## 2025-02-10 ENCOUNTER — TELEPHONE (OUTPATIENT)
Dept: PRIMARY CARE | Facility: CLINIC | Age: 60
End: 2025-02-10
Payer: COMMERCIAL

## 2025-02-10 NOTE — TELEPHONE ENCOUNTER
PT wanted to give Ariadne a BP update...  (Our machine wasn't working)    116/75.    Does she still need the 2/28/25 @ 8:00 appointment?    Please advise.

## 2025-02-28 ENCOUNTER — APPOINTMENT (OUTPATIENT)
Dept: PRIMARY CARE | Facility: CLINIC | Age: 60
End: 2025-02-28
Payer: COMMERCIAL

## 2025-03-04 ENCOUNTER — HOSPITAL ENCOUNTER (OUTPATIENT)
Dept: RADIOLOGY | Facility: HOSPITAL | Age: 60
Discharge: HOME | End: 2025-03-04
Payer: COMMERCIAL

## 2025-03-04 DIAGNOSIS — R92.8 ABNORMAL MAMMOGRAM: ICD-10-CM

## 2025-03-04 PROBLEM — R79.89 ELEVATED VITAMIN B12 LEVEL: Status: ACTIVE | Noted: 2025-03-04

## 2025-03-04 PROCEDURE — 77061 BREAST TOMOSYNTHESIS UNI: CPT | Mod: RT

## 2025-03-06 ENCOUNTER — APPOINTMENT (OUTPATIENT)
Dept: OPHTHALMOLOGY | Facility: CLINIC | Age: 60
End: 2025-03-06
Payer: COMMERCIAL

## 2025-03-20 ENCOUNTER — APPOINTMENT (OUTPATIENT)
Facility: CLINIC | Age: 60
End: 2025-03-20
Payer: COMMERCIAL

## 2025-03-20 ENCOUNTER — APPOINTMENT (OUTPATIENT)
Dept: OPHTHALMOLOGY | Facility: CLINIC | Age: 60
End: 2025-03-20
Payer: COMMERCIAL

## 2025-03-20 DIAGNOSIS — H02.824 CYST OF LEFT UPPER EYELID: ICD-10-CM

## 2025-03-20 DIAGNOSIS — H00.14 CHALAZION OF LEFT UPPER EYELID: Primary | ICD-10-CM

## 2025-03-20 PROCEDURE — 99213 OFFICE O/P EST LOW 20 MIN: CPT

## 2025-03-20 RX ORDER — NEOMYCIN SULFATE, POLYMYXIN B SULFATE, AND DEXAMETHASONE 3.5; 10000; 1 MG/G; [USP'U]/G; MG/G
OINTMENT OPHTHALMIC
Qty: 3.5 G | Refills: 1 | Status: SHIPPED | OUTPATIENT
Start: 2025-03-20

## 2025-03-20 ASSESSMENT — ENCOUNTER SYMPTOMS
CONSTITUTIONAL NEGATIVE: 0
ENDOCRINE NEGATIVE: 0
RESPIRATORY NEGATIVE: 0
EYES NEGATIVE: 1
MUSCULOSKELETAL NEGATIVE: 0
NEUROLOGICAL NEGATIVE: 0
PSYCHIATRIC NEGATIVE: 0
HEMATOLOGIC/LYMPHATIC NEGATIVE: 0
GASTROINTESTINAL NEGATIVE: 0
CARDIOVASCULAR NEGATIVE: 0
ALLERGIC/IMMUNOLOGIC NEGATIVE: 0

## 2025-03-20 ASSESSMENT — VISUAL ACUITY
OS_SC: 20/20
METHOD: SNELLEN - LINEAR
OD_SC: 20/20

## 2025-03-20 ASSESSMENT — CONF VISUAL FIELD
OD_NORMAL: 1
OD_INFERIOR_TEMPORAL_RESTRICTION: 0
OD_INFERIOR_NASAL_RESTRICTION: 0
OS_INFERIOR_NASAL_RESTRICTION: 0
OS_SUPERIOR_TEMPORAL_RESTRICTION: 0
OS_INFERIOR_TEMPORAL_RESTRICTION: 0
OS_NORMAL: 1
OD_SUPERIOR_TEMPORAL_RESTRICTION: 0
OD_SUPERIOR_NASAL_RESTRICTION: 0
OS_SUPERIOR_NASAL_RESTRICTION: 0

## 2025-03-20 ASSESSMENT — EXTERNAL EXAM - LEFT EYE: OS_EXAM: NORMAL

## 2025-03-20 ASSESSMENT — SLIT LAMP EXAM - LIDS: COMMENTS: NORMAL

## 2025-03-20 ASSESSMENT — EXTERNAL EXAM - RIGHT EYE: OD_EXAM: NORMAL

## 2025-03-20 NOTE — PROGRESS NOTES
59yoF presenting with left upper eyelid chalazion status post (s/p) self-drainage 12/21/24 after maxitrol ointment/massage/Zpac. Now here status post (s/p) corticosteroid injection on 2/6/25.    Now with residual erythematous area overlying ROMERO canaliculus with adjacent insipissated glands and corresponding tarsal conjunctival inflammation  Reports that stye is still present and occasionally itchy. Does not feel injection helped much.  Finished with doxycycline therapy for 10 days  Finished maxitrol ointment course of 10 days.   No WCs    Assessment/Plan  ROMERO resolving chalazion medially  Nearly resolved. Although patient noticed no improvement with medical therapy and steroid injection it looks smaller clinically.   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  Discussed r/b/a of observation vs surgical excision, again citing the risk of madarosis given the location. Pt does not wish to have excision at this time and prefers to observe.   No concerning features with either lesion (i.e. no madarosis/ulceration/telangiectasia)  For ROMERO chalazion, which is nearly resolved, discussed options of conservative treatment with maxitrol/doxy vs corticosteroid injection. Given small residual size and proximity to lacrimal system, would not recommend surgical excision at this juncture. Patient also prefers to defer any further injections at this time. Will continue with medical therapy for now.   Recommend warm compresses BID with massaging   Rx sent for maxitrol BID left eye/eyelid for 14 days  F/U in 4 weeks for reassessment and further management if necessary. Discussed return precautions and instructed pt to call, present to ED, or seek medical  attention with any acute changes or worsening.

## 2025-03-24 ENCOUNTER — HOSPITAL ENCOUNTER (OUTPATIENT)
Dept: NEUROLOGY | Facility: HOSPITAL | Age: 60
Discharge: HOME | End: 2025-03-24
Payer: COMMERCIAL

## 2025-03-24 DIAGNOSIS — G56.02 CARPAL TUNNEL SYNDROME OF LEFT WRIST: ICD-10-CM

## 2025-03-24 PROCEDURE — 95909 NRV CNDJ TST 5-6 STUDIES: CPT | Performed by: PSYCHIATRY & NEUROLOGY

## 2025-03-24 PROCEDURE — 95886 MUSC TEST DONE W/N TEST COMP: CPT | Performed by: PSYCHIATRY & NEUROLOGY

## 2025-03-24 NOTE — ADDENDUM NOTE
Encounter addended by: Angie Carrera, RT on: 3/24/2025 3:15 PM   Actions taken: Imaging Exam ended, Check Out activity completed

## 2025-03-27 ENCOUNTER — OFFICE VISIT (OUTPATIENT)
Dept: DERMATOLOGY | Facility: CLINIC | Age: 60
End: 2025-03-27
Payer: COMMERCIAL

## 2025-03-27 DIAGNOSIS — L28.1 PRURIGO NODULARIS: Primary | ICD-10-CM

## 2025-03-27 PROCEDURE — 99204 OFFICE O/P NEW MOD 45 MIN: CPT | Performed by: NURSE PRACTITIONER

## 2025-03-27 RX ORDER — LORATADINE 10 MG/1
10 TABLET ORAL 2 TIMES DAILY
Qty: 60 TABLET | Refills: 3 | Status: SHIPPED | OUTPATIENT
Start: 2025-03-27 | End: 2026-03-27

## 2025-03-27 RX ORDER — HYDROXYZINE HYDROCHLORIDE 25 MG/1
25 TABLET, FILM COATED ORAL NIGHTLY
Qty: 14 TABLET | Refills: 1 | Status: SHIPPED | OUTPATIENT
Start: 2025-03-27 | End: 2025-04-24

## 2025-03-27 NOTE — PROGRESS NOTES
Subjective     Carli Moore is a 59 y.o. female who presents for the following: Rash.   New patient in for rash to arms bilaterally present for years patient states flaring off and on.  Patient states that it keeps her awake or wakes her from her sleep at night and is extremely itchy to the point that she bleeds from scratching.  Patient describes the sensation as bugs crawling.  Patient states that is affecting her activities of daily living.  Patient has tried taking allergy medication year-round with no relief, Banophen 25 mg every 6 hours, clobetasol 0.05% cream, Hawaiian moon aloe cream, triamcinolone 0.1% cream.  None of the above treatments patient states have been able to give her relief of the symptoms.    Review of Systems:  No other skin or systemic complaints other than what is documented elsewhere in the note.    The following portions of the chart were reviewed this encounter and updated as appropriate:       Skin Cancer History  No skin cancer on file.    Specialty Problems    None    Past Medical History:  Carli Moore  has a past medical history of HL (hearing loss) (Birth).    Past Surgical History:  Carli Moore  has a past surgical history that includes  section, classic (2014); Other surgical history (2014); Other surgical history (2021); Other surgical history (2021);  section, low transverse ([id number], [id number], [id number]); and Breast biopsy ().    Family History:  Patient family history includes Hearing loss in her sister.    Social History:  Carli Moore  reports that she quit smoking about 17 years ago. Her smoking use included cigarettes. She started smoking about 25 years ago. She has a 2.5 pack-year smoking history. She has never been exposed to tobacco smoke. She has never used smokeless tobacco. She reports that she does not currently use alcohol. She reports that she does not currently use drugs.    Allergies:  Bee pollen; Gloves,  latex with aloe vera; Hydrocodone-acetaminophen; Levofloxacin; Penicillins; and Quinolones    Current Medications / CAM's:    Current Outpatient Medications:     albuterol (ProAir HFA) 90 mcg/actuation inhaler, Inhale 2 puffs every 4 hours if needed for wheezing or shortness of breath. (Patient not taking: Reported on 1/31/2025), Disp: 8.5 g, Rfl: 0    ascorbic acid (Vitamin C) 500 mg tablet, Take 1 tablet (500 mg) by mouth once daily., Disp: 90 tablet, Rfl: 3    Bifidobacterium infantis (ALIGN) 4 mg capsule, Take 1 capsule (4 mg) by mouth early in the morning.., Disp: , Rfl:     cetirizine (ZyrTEC) 10 mg tablet, Take 1 tablet (10 mg) by mouth once daily., Disp: 30 tablet, Rfl: 5    cholecalciferol (Vitamin D3) 50 mcg (2,000 unit) capsule, Take 1 capsule (50 mcg) by mouth once daily., Disp: 90 capsule, Rfl: 3    diphenhydrAMINE (BENADryl) 25 mg capsule, Take 1 capsule (25 mg) by mouth every 6 hours if needed for itching for up to 10 days., Disp: 30 capsule, Rfl: 0    famotidine (Pepcid) 20 mg tablet, Take 1 tablet (20 mg) by mouth 2 times a day., Disp: 60 tablet, Rfl: 5    hydrOXYzine HCL (Atarax) 25 mg tablet, Take 1 tablet (25 mg) by mouth once daily at bedtime for 28 days., Disp: 14 tablet, Rfl: 1    loratadine (Claritin) 10 mg tablet, Take 1 tablet (10 mg) by mouth 2 times a day., Disp: 60 tablet, Rfl: 3    multivitamin-Ca-iron-minerals (Tab-A-Sera Womens) 27-0.4 mg tablet, Take 1 tablet by mouth once daily., Disp: , Rfl:     nemolizumab-ilto 30 mg pen injector, Inject 60 mg under the skin 1 time for 1 dose., Disp: 2 each, Rfl: 0    neomycin-polymyxin B-dexameth (Polydex) 3.5 mg/g-10,000 unit/g-0.1 % ointment ophthalmic ointment, Apply to left upper eyelid in the morning and at bedtime, Disp: 3.5 g, Rfl: 1    psyllium (Metamucil) 0.4 gram capsule, Take 2 capsules by mouth early in the morning.., Disp: , Rfl:      Objective   Well appearing patient in no apparent distress; mood and affect are within normal  "limits.      Assessment/Plan   1. Prurigo nodularis  Left Antecubital Fossa, Left Forearm - Anterior, Left Upper Arm - Anterior, Right Antecubital Fossa, Right Forearm - Anterior, Right Upper Arm - Anterior  Erythematous papules with excoriation(s) that began \"years\" ago with progressively worsening of itch in the last month.  Primarily excoriations on exam today, but she states this is having a dramatic impact on her quality of life.     -Discussed nature of condition  -Reviewed this condition is often difficult to treat  -Recommend to begin measures to reduce pruritus, including emollients with pramoxine (such as CeraVe Itch Relief lotion twice daily every day), ice packs to use to interrupt itch attacks, and may also obtain over the counter Sarna lotion (keep in the fridge, apply cold whenever itching arises).     Related Procedures  Follow Up In Dermatology - Established Patient    Related Medications  loratadine (Claritin) 10 mg tablet  Take 1 tablet (10 mg) by mouth 2 times a day.    hydrOXYzine HCL (Atarax) 25 mg tablet  Take 1 tablet (25 mg) by mouth once daily at bedtime for 28 days.    nemolizumab-ilto 30 mg pen injector  Inject 60 mg under the skin 1 time for 1 dose.         "

## 2025-03-28 ENCOUNTER — HOSPITAL ENCOUNTER (OUTPATIENT)
Dept: RADIOLOGY | Facility: CLINIC | Age: 60
Discharge: HOME | End: 2025-03-28
Payer: COMMERCIAL

## 2025-03-28 ENCOUNTER — SPECIALTY PHARMACY (OUTPATIENT)
Dept: PHARMACY | Facility: CLINIC | Age: 60
End: 2025-03-28

## 2025-03-28 DIAGNOSIS — Z13.6 ENCOUNTER FOR SCREENING FOR CARDIAC ALLOGRAFT VASCULOPATHY: ICD-10-CM

## 2025-03-28 PROCEDURE — 75571 CT HRT W/O DYE W/CA TEST: CPT

## 2025-03-31 ENCOUNTER — APPOINTMENT (OUTPATIENT)
Dept: RADIOLOGY | Facility: CLINIC | Age: 60
End: 2025-03-31
Payer: COMMERCIAL

## 2025-04-11 ENCOUNTER — CLINICAL SUPPORT (OUTPATIENT)
Dept: AUDIOLOGY | Facility: CLINIC | Age: 60
End: 2025-04-11
Payer: COMMERCIAL

## 2025-04-11 DIAGNOSIS — H90.3 SENSORINEURAL HEARING LOSS (SNHL) OF BOTH EARS: Primary | ICD-10-CM

## 2025-04-15 NOTE — PROGRESS NOTES
HEARING AID CHECK     Name: Carli Moore  YOB: 1965  Age: 58 y.o.     Date:  4/15/2025     History:  Carli Moore , age 59 years, is here for a hearing aid check to pick her hearing aids to purchase through insurance.     Recall, her most recent hearing test on 8/20/2021 indicated mild sloping to moderately severe sensorineural hearing loss of both ears. She has history of hearing aid use and previously wore Oticon- in the ear hearing aids purchased in 2018 through [facility].      Hearing Aid Information  Right: Phonak Virto  SN: [id number] ~ AOV   Left: Phonak Virto  SN: [id number] ~ AOV   Warranty expiration date: 1/28/2025  Fit date: 2/18/2021     Hearing Aid:  Carli chose Virto I70 hearing aids to be purchased through insurance. Phonak will be contacted to determine if the titanium or 10-NW hearing aid is most appropriate. Hearing aid prior authorization will be submitted following ENT medical clearance on 4/24/2025.    Recommendations  1) Return for hearing aid checks as needed  2) Follow up for hearing aid fitting once hearing aid arrive  3) Re-test hearing annually.    Time: 5713-8935    Completed by:  Richard Tai, CCC-A  Licensed Senior Audiologist

## 2025-04-17 ENCOUNTER — APPOINTMENT (OUTPATIENT)
Dept: OPHTHALMOLOGY | Facility: CLINIC | Age: 60
End: 2025-04-17
Payer: COMMERCIAL

## 2025-04-24 ENCOUNTER — APPOINTMENT (OUTPATIENT)
Facility: CLINIC | Age: 60
End: 2025-04-24
Payer: COMMERCIAL

## 2025-04-24 VITALS — SYSTOLIC BLOOD PRESSURE: 135 MMHG | DIASTOLIC BLOOD PRESSURE: 91 MMHG

## 2025-04-24 DIAGNOSIS — H61.23 BILATERAL IMPACTED CERUMEN: ICD-10-CM

## 2025-04-24 DIAGNOSIS — H90.3 BILATERAL SENSORINEURAL HEARING LOSS: Primary | ICD-10-CM

## 2025-04-24 PROCEDURE — 1036F TOBACCO NON-USER: CPT | Performed by: OTOLARYNGOLOGY

## 2025-04-24 PROCEDURE — 69210 REMOVE IMPACTED EAR WAX UNI: CPT | Performed by: OTOLARYNGOLOGY

## 2025-04-24 PROCEDURE — 99203 OFFICE O/P NEW LOW 30 MIN: CPT | Performed by: OTOLARYNGOLOGY

## 2025-04-24 ASSESSMENT — PAIN SCALES - GENERAL: PAINLEVEL_OUTOF10: 0-NO PAIN

## 2025-04-24 NOTE — PROGRESS NOTES
Impression:  1. Bilateral sensorineural hearing loss        2. Bilateral impacted cerumen             RECOMMENDATIONS/PLAN :  Using the operative microscope and alligator forcep I was able to remove her impacted cerumen today.  I reviewed her previous audiogram from January 2025 and her hearing is stable.  She does have a moderate sensorineural hearing loss.  She is cleared for new hearing aids.  I recommend that she use Debrox wax softening drops every couple of months-10 drops in each ear twice daily for 2 days.  She can otherwise follow-up as needed.      **This electronic medical record note was created with the use of voice recognition software.  Despite proofreading, typographical or grammatical errors may be present that could affect meaning of content **    Subjective   Patient ID:     Carli Moore is a 59 y.o. female who presents to the office today as a checkup on her ears because she is obtaining new hearing aids.  She has a longstanding history of a bilateral sensorineural loss.  She denies any fluctuation in hearing or any recent upper respiratory complaints fever or chills.  No imbalance or vertigo.  No recent middle ear infections or drainage from the ears.  She is somewhat anxious about having her ears cleaned today.    ROS:  A detailed 12 system review of systems is noted on the intake form has been reviewed with the patient with details noted in the HPI and scanned into the patient's medical record.    Objective     Medical History[1]     Surgical History[2]     RX Allergies[3]     Current Medications[4]     Tobacco Use: Medium Risk (4/24/2025)    Patient History     Smoking Tobacco Use: Former     Smokeless Tobacco Use: Never     Passive Exposure: Never        Alcohol Use: Not on file        Social History     Substance and Sexual Activity   Drug Use Not Currently        Physical Exam:  Visit Vitals  BP (!) 135/91 (BP Location: Right arm)   OB Status Postmenopausal   Smoking Status Former       General: Patient is alert, oriented, cooperative in no apparent distress.  Head: Normocephalic, atraumatic.  Eyes: PERRL, EOMI, Conjunctiva is clear. No nystagmus.  Ears: Right Ear-- Pinna is normal.  External auditory canal is occluded with cerumen.. Tympanic membrane is [intact, translucent and has good mobility with my pneumatic otoscope. No effusion].  Mastoid is nontender.  Left ear-- Pinna is normal.  External auditory canal is occluded with cerumen.. Tympanic membrane is [intact, translucent and has good mobility with my pneumatic otoscope.  No effusion].  Mastoid is nontender.  Nose: Septum is relatively straight.  No septal perforation or lesions. No septal hematoma/ seroma.  No signs of bleeding.  Inferior turbinates are normal.   No evidence of intranasal polyps.  No infectious drainage.  Throat:  Floor of mouth is clear, no masses.  Tongue appears normal, no lesions or masses. Gums, gingiva, buccal mucosa appear pink and moist, no lesions. Teeth are in good repair.  No obvious dental infections.  Peritonsillar regions appear symmetric without swelling.  Hard and soft palate appear normal, no obvious cleft. Uvula is midline.  Oropharynx: No lesions. Retropharyngeal wall is flat.  No active postnasal drip.  Neck: Supple,  no lymphadenopathy.  No masses.  Salivary Glands: Symmetric bilaterally.  No palpable masses.  No evidence of acute infection or salivary stones  Neurologic: Cranial Nerves 2-12 are grossly intact without focal deficits. Cerebellar function testing is normal.     Results:   I reviewed her recent audiogram from January 21, 2025 and she does have a moderate sensorineural hearing loss that is symmetric in both ears.  Both tympanic membranes have good mobility on tympanometry.  Word recognition scores are 88% in the right ear and 80% in the left ear.  Speech reception threshold is 40 dB bilaterally.  This is essentially unchanged from her prior audiogram    Procedure:   After informed  "consent was obtained with the risks, benefits, complications, and alternatives explained to the patient / guardian, the patient was laid back in the ENT chair and the operative microscope was brought to the [left] ear.  A speculum was placed and using the operative microscope and alligator forcep, I was able to carefully remove all of the impacted cerumen that was causing pain/ hearing loss.  After doing so, the patient was feeling much better and had much less pain.  The TM was [intact, translucent and had good mobility with my pneumatic otoscope].  The head was rotated to the opposite side and attention was brought to the [right] ear.  A speculum was placed and using the operative microscope and alligator forcep,, I was able to remove all of the impacted cerumen that was causing pain and hearing loss.  After doing so, the patient was feeling much better and had much less pain.  The TM was [ intact, translucent and had good mobility with my pneumatic otoscope].  The patient tolerated the procedure well and there were no complications.      Qasim Matthew DO        [1]   Past Medical History:  Diagnosis Date    HL (hearing loss) Birth   [2]   Past Surgical History:  Procedure Laterality Date    BREAST BIOPSY       SECTION, CLASSIC  2014     Section     SECTION, LOW TRANSVERSE  [id number], [id number], [id number]    OTHER SURGICAL HISTORY  2014    Salpingectomy For Ectopic Pregnancy    OTHER SURGICAL HISTORY  2021     section    OTHER SURGICAL HISTORY  2021    Bunionectomy   [3]   Allergies  Allergen Reactions    Bee Pollen Unknown    Gloves, Latex With Aloe Vera Unknown    Hydrocodone-Acetaminophen Nausea Only    Levofloxacin Unknown    Penicillins Unknown     ?    \"mom told me I was allergic, unknown reaction\"    Quinolones Unknown     LEVAQUIN....THROAT TIGHTENS   [4]   Current Outpatient Medications:     albuterol (ProAir HFA) 90 mcg/actuation inhaler, Inhale 2 " puffs every 4 hours if needed for wheezing or shortness of breath. (Patient not taking: Reported on 1/31/2025), Disp: 8.5 g, Rfl: 0    ascorbic acid (Vitamin C) 500 mg tablet, Take 1 tablet (500 mg) by mouth once daily., Disp: 90 tablet, Rfl: 3    Bifidobacterium infantis (ALIGN) 4 mg capsule, Take 1 capsule (4 mg) by mouth early in the morning.., Disp: , Rfl:     cetirizine (ZyrTEC) 10 mg tablet, Take 1 tablet (10 mg) by mouth once daily., Disp: 30 tablet, Rfl: 5    cholecalciferol (Vitamin D3) 50 mcg (2,000 unit) capsule, Take 1 capsule (50 mcg) by mouth once daily., Disp: 90 capsule, Rfl: 3    diphenhydrAMINE (BENADryl) 25 mg capsule, Take 1 capsule (25 mg) by mouth every 6 hours if needed for itching for up to 10 days., Disp: 30 capsule, Rfl: 0    famotidine (Pepcid) 20 mg tablet, Take 1 tablet (20 mg) by mouth 2 times a day., Disp: 60 tablet, Rfl: 5    hydrOXYzine HCL (Atarax) 25 mg tablet, Take 1 tablet (25 mg) by mouth once daily at bedtime for 28 days., Disp: 14 tablet, Rfl: 1    loratadine (Claritin) 10 mg tablet, Take 1 tablet (10 mg) by mouth 2 times a day., Disp: 60 tablet, Rfl: 3    multivitamin-Ca-iron-minerals (Tab-A-Sear Womens) 27-0.4 mg tablet, Take 1 tablet by mouth once daily., Disp: , Rfl:     neomycin-polymyxin B-dexameth (Polydex) 3.5 mg/g-10,000 unit/g-0.1 % ointment ophthalmic ointment, Apply to left upper eyelid in the morning and at bedtime, Disp: 3.5 g, Rfl: 1    psyllium (Metamucil) 0.4 gram capsule, Take 2 capsules by mouth early in the morning.., Disp: , Rfl:

## 2025-06-11 ENCOUNTER — TELEPHONE (OUTPATIENT)
Dept: PRIMARY CARE | Facility: CLINIC | Age: 60
End: 2025-06-11
Payer: COMMERCIAL

## 2025-06-11 NOTE — TELEPHONE ENCOUNTER
Pt called and stated she has 2 painful lumps between her vagina and rectum that have appeared since Sunday.  They are extremely uncomfortable and the patient would like to be seen ASAP.    Please advise

## 2025-06-17 ENCOUNTER — APPOINTMENT (OUTPATIENT)
Dept: PRIMARY CARE | Facility: CLINIC | Age: 60
End: 2025-06-17
Payer: COMMERCIAL

## 2025-06-17 VITALS
BODY MASS INDEX: 27.54 KG/M2 | SYSTOLIC BLOOD PRESSURE: 104 MMHG | RESPIRATION RATE: 16 BRPM | TEMPERATURE: 97.7 F | WEIGHT: 170.6 LBS | HEART RATE: 80 BPM | OXYGEN SATURATION: 97 % | DIASTOLIC BLOOD PRESSURE: 70 MMHG

## 2025-06-17 DIAGNOSIS — L73.9 FOLLICULITIS OF PERINEUM: Primary | ICD-10-CM
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
## 2025-06-17 PROCEDURE — 99214 OFFICE O/P EST MOD 30 MIN: CPT | Performed by: NURSE PRACTITIONER

## 2025-06-17 RX ORDER — DOXYCYCLINE 100 MG/1
100 CAPSULE ORAL 2 TIMES DAILY
Qty: 14 CAPSULE | Refills: 0 | Status: SHIPPED | OUTPATIENT
Start: 2025-06-17 | End: 2025-06-17

## 2025-06-17 RX ORDER — BACITRACIN ZINC 500 UNIT/G
OINTMENT (GRAM) TOPICAL 2 TIMES DAILY
Qty: 14 G | Refills: 0 | Status: SHIPPED | OUTPATIENT
Start: 2025-06-17 | End: 2025-06-17

## 2025-06-17 RX ORDER — BACITRACIN ZINC 500 UNIT/G
OINTMENT (GRAM) TOPICAL 2 TIMES DAILY
Qty: 14 G | Refills: 0 | Status: SHIPPED | OUTPATIENT
Start: 2025-06-17

## 2025-06-17 RX ORDER — DOXYCYCLINE 100 MG/1
100 CAPSULE ORAL 2 TIMES DAILY
Qty: 10 CAPSULE | Refills: 0 | Status: SHIPPED | OUTPATIENT
Start: 2025-06-17 | End: 2025-06-22

## 2025-06-17 ASSESSMENT — ENCOUNTER SYMPTOMS
HEMATURIA: 0
DIFFICULTY URINATING: 0
CHILLS: 0
FEVER: 0

## 2025-06-17 ASSESSMENT — PATIENT HEALTH QUESTIONNAIRE - PHQ9
2. FEELING DOWN, DEPRESSED OR HOPELESS: NOT AT ALL
1. LITTLE INTEREST OR PLEASURE IN DOING THINGS: NOT AT ALL
SUM OF ALL RESPONSES TO PHQ9 QUESTIONS 1 AND 2: 0

## 2025-06-17 NOTE — PROGRESS NOTES
Subjective   Carli Moore is a 59 y.o. female who presents for a singular left Lumps between vagina and rectum (1 week, sometimes painful). On the right side, she does not have a bump, but states it burns when she urinates. Pt admits she did pick at the area and is worried she may have made symptoms worse. Pt also notes she does have several mosquito bites on arms and legs and also that she has been sitting in her home hot tub recently as well.   HPI  Review of Systems   Constitutional:  Negative for chills and fever.   Genitourinary:  Negative for difficulty urinating, hematuria, pelvic pain, vaginal discharge and vaginal pain.   Skin:         Painful lump on left posterior perineum; burning pain with a tiny bump or scratch on right posterior labia majora.     Objective   Physical Exam  Constitutional:       Appearance: Normal appearance. She is not ill-appearing.   Cardiovascular:      Rate and Rhythm: Normal rate.   Pulmonary:      Effort: Pulmonary effort is normal.   Neurological:      Mental Status: She is alert and oriented to person, place, and time.       /70   Pulse 80   Temp 36.5 °C (97.7 °F) (Temporal)   Resp 16   Wt 77.4 kg (170 lb 9.6 oz)   SpO2 97%   BMI 27.54 kg/m²   Assessment/Plan   Problem List Items Addressed This Visit       Folliculitis of perineum - Primary    Relevant Medications    doxycycline (Vibramycin) 100 mg capsule     Other Visit Diagnoses         Mosquito bite, initial encounter        This is on right posterior perineum and is a small, flesh-colored papule that appears to be a mosquito. She has multiple other mosquito bites. Apply Bacitracin    Relevant Medications    bacitracin 500 unit/gram ointment

## 2025-06-23 ENCOUNTER — CLINICAL SUPPORT (OUTPATIENT)
Dept: AUDIOLOGY | Facility: CLINIC | Age: 60
End: 2025-06-23
Payer: COMMERCIAL

## 2025-06-23 DIAGNOSIS — H90.3 SENSORINEURAL HEARING LOSS (SNHL) OF BOTH EARS: Primary | ICD-10-CM

## 2025-06-23 PROCEDURE — V5299 HEARING SERVICE: HCPCS | Mod: AUDSP | Performed by: AUDIOLOGIST

## 2025-06-23 NOTE — PROGRESS NOTES
HEARING AID CHECK     Name: Carli Moore  YOB: 1965  Age: 58 y.o.     Date:  4/15/2025     History:  Carli Moore , age 59 years, is here for a hearing aid check noting that both hearing aids left > right.     Recall, her most recent hearing test on 8/20/2021 indicated mild sloping to moderately severe sensorineural hearing loss of both ears. She has history of hearing aid use and previously wore Oticon- in the ear hearing aids purchased in 2018 through [facility].      Hearing Aid Information  Right: Phonak Virto  SN: [id number] ~ AOV   Left: Phonak Virto  SN: [id number] ~ AOV   Warranty expiration date: 1/28/2025  Fit date: 2/18/2021     Hearing Aid:  Hearing aids were cleaned and check. Discussed using a electric dryer due to potential moisture in the hearing aids.     Recommendations  1) Return for hearing aid checks as needed  2) Follow up for hearing aid fitting in Feb 2026  3) Re-test hearing annually.    Time: 8644-7876    Completed by:  Richard Tai, CCC-A  Licensed Senior Audiologist

## 2025-07-16 ENCOUNTER — APPOINTMENT (OUTPATIENT)
Dept: DERMATOLOGY | Facility: CLINIC | Age: 60
End: 2025-07-16
Payer: COMMERCIAL

## 2026-02-03 ENCOUNTER — APPOINTMENT (OUTPATIENT)
Dept: PRIMARY CARE | Facility: CLINIC | Age: 61
End: 2026-02-03
Payer: COMMERCIAL